# Patient Record
Sex: MALE | Race: ASIAN | Employment: FULL TIME | URBAN - METROPOLITAN AREA
[De-identification: names, ages, dates, MRNs, and addresses within clinical notes are randomized per-mention and may not be internally consistent; named-entity substitution may affect disease eponyms.]

---

## 2017-09-25 ENCOUNTER — OFFICE VISIT (OUTPATIENT)
Dept: PSYCHOLOGY | Facility: CLINIC | Age: 37
End: 2017-09-25
Payer: COMMERCIAL

## 2017-09-25 DIAGNOSIS — F41.1 GAD (GENERALIZED ANXIETY DISORDER): ICD-10-CM

## 2017-09-25 DIAGNOSIS — F32.1 MAJOR DEPRESSIVE DISORDER, SINGLE EPISODE, MODERATE (H): Primary | ICD-10-CM

## 2017-09-25 PROCEDURE — 90834 PSYTX W PT 45 MINUTES: CPT | Performed by: PSYCHOLOGIST

## 2017-09-25 ASSESSMENT — ANXIETY QUESTIONNAIRES
1. FEELING NERVOUS, ANXIOUS, OR ON EDGE: SEVERAL DAYS
7. FEELING AFRAID AS IF SOMETHING AWFUL MIGHT HAPPEN: SEVERAL DAYS
3. WORRYING TOO MUCH ABOUT DIFFERENT THINGS: SEVERAL DAYS
2. NOT BEING ABLE TO STOP OR CONTROL WORRYING: SEVERAL DAYS
IF YOU CHECKED OFF ANY PROBLEMS ON THIS QUESTIONNAIRE, HOW DIFFICULT HAVE THESE PROBLEMS MADE IT FOR YOU TO DO YOUR WORK, TAKE CARE OF THINGS AT HOME, OR GET ALONG WITH OTHER PEOPLE: VERY DIFFICULT
6. BECOMING EASILY ANNOYED OR IRRITABLE: SEVERAL DAYS
5. BEING SO RESTLESS THAT IT IS HARD TO SIT STILL: SEVERAL DAYS
GAD7 TOTAL SCORE: 7

## 2017-09-25 ASSESSMENT — PATIENT HEALTH QUESTIONNAIRE - PHQ9
5. POOR APPETITE OR OVEREATING: SEVERAL DAYS
SUM OF ALL RESPONSES TO PHQ QUESTIONS 1-9: 15

## 2017-09-25 NOTE — PROGRESS NOTES
Progress Note - Initial Session    Client Name:  Anjum Rodriguez Date: 9/25/2017           Service Type: Diagnostic Evaluation      Session Start Time: 12:00PM  Session End Time: 12:50PM      Session Length: 38 - 52      Session #: 1     Attendees: Client attended alone         Diagnostic Assessment in progress.  Unable to complete documentation at the conclusion of the first session due to needing to gather more information to complete the ADHD diagnosis.       Mental Status Assessment:  Appearance:   Appropriate   Eye Contact:   Good   Psychomotor Behavior: Normal   Attitude:   Cooperative   Orientation:   All  Speech   Rate / Production: Normal    Volume:  Normal   Mood:    Normal  Affect:    Appropriate   Thought Content:  Clear   Thought Form:  Coherent  Logical   Insight:    Good       Safety Issues and Plan for Safety and Risk Management:  Client denies current fears or concerns for personal safety.  Client denies current or recent suicidal ideation or behaviors.  Client denies current or recent homicidal ideation or behaviors.  Client denies current or recent self injurious behavior or ideation.  Client denies other safety concerns.  A safety and risk management plan has not been developed at this time, however client was given the after-hours number / 911 should there be a change in any of these risk factors.  Client reports there are I did not speak with him about firearms, but I will during his next visit. .      Diagnostic Criteria:  B. The person finds it difficult to control the worry.  C. Select 3 or more symptoms (required for diagnosis). Only one item is required in children.   - Restlessness or feeling keyed up or on edge.    - Being easily fatigued.    - Difficulty concentrating or mind going blank.    - Irritability.    - Sleep disturbance (difficulty falling or staying asleep, or restless unsatisfying sleep).   D. The focus of the anxiety and worry is not confined to features of an  Axis I disorder.  E. The anxiety, worry, or physical symptoms cause clinically significant distress or impairment in social, occupational, or other important areas of functioning.   F. The disturbance is not due to the direct physiological effects of a substance (e.g., a drug of abuse, a medication) or a general medical condition (e.g., hyperthyroidism) and does not occur exclusively during a Mood Disorder, a Psychotic Disorder, or a Pervasive Developmental Disorder.    - The aformentioned symptoms began 3 year(s) ago and occurs 7 days per week and is experienced as mild.  A) Single episode - symptoms have been present during the same 2-week period and represent a change from previous functioning 5 or more symptoms (required for diagnosis)   - Diminished interest or pleasure in all, or almost all, activities.    - Increased sleep.    - Psychomotor activity agitation.    - Fatigue or loss of energy.    - Diminished ability to think or concentrate, or indecisiveness.   B) The symptoms cause clinically significant distress or impairment in social, occupational, or other important areas of functioning  C) The episode is not attributable to the physiological effects of a substance or to another medical condition  D) The occurence of major depressive episode is not better explained by other thought / psychotic disorders  E) There has never been a manic episode or hypomanic episode        DSM5 Diagnoses: (Sustained by DSM5 Criteria Listed Above)  Diagnoses: 296.22 (F32.1)  Major Depressive Disorder, Single Episode, Moderate _  300.02 (F41.1) Generalized Anxiety Disorder  Psychosocial & Contextual Factors: The client said that when he started going to school part time, he noticed his difficulty with attention, procrastination, and follow through. He described feeling overwhelmed by his role as a student, full time employee, , and father to 6.   WHODAS 2.0 (12 item)            This questionnaire asks about  difficulties due to health conditions. Health conditions  include  disease or illnesses, other health problems that may be short or long lasting,  injuries, mental health or emotional problems, and problems with alcohol or drugs.                     Think back over the past 30 days and answer these questions, thinking about how much  difficulty you had doing the following activities. For each question, please [place] only  one response.    S1 Standing for long periods such as 30 minutes? Moderate =   3   S2 Taking care of household responsibilities? Severe =       4   S3 Learning a new task, for example, learning how to get to a new place? Mild =           2   S4 How much of a problem do you have joining community activities (for example, festivals, Yazidi or other activities) in the same way as anyone else can? Severe =       4   S5 How much have you been emotionally affected by your health problems? Moderate =   3     In the past 30 days, how much difficulty did you have in:   S6 Concentrating on doing something for ten minutes? Severe =       4   S7 Walking a long distance such as a kilometer (or equivalent)? Moderate =   3   S8 Washing your whole body? None =         1   S9 Getting dressed? None =         1   S10 Dealing with people you do not know? Moderate =   3   S11 Maintaining a friendship? Moderate =   3   S12 Your day to day work? Moderate =   3     H1 Overall, in the past 30 days, how many days were these difficulties present? Record number of days 30   H2 In the past 30 days, for how many days were you totally unable to carry out your usual activities or work because of any health condition? Record number of days  6   H3 In the past 30 days, not counting the days that you were totally unable, for how many days did you cut back or reduce your usual activities or work because of any health condition? Record number of days 10       Collateral Reports Completed:  Routed note to PCP      PLAN: (Homework,  other):  Client stated that he will return next week for the next phase of the ADHD evaluation.       Martha Freeman PsyD

## 2017-09-25 NOTE — MR AVS SNAPSHOT
"                  MRN:[id number]                      After Visit Summary   2017    Anjum Rodriguez    MRN: [id number]           Visit Information        Provider Department      2017 12:00 PM EthanmayoguillermoMartha Ms, PsyD [facility] ADHD      Your next 10 appointments already scheduled     Oct 02, 2017 12:00 PM CDT   Return Visit with Martha Freeman PsyD   [facility]    [address]  [facility] [address]   [phone]              MyChart Information     [employer] lets you send messages to your doctor, view your test results, renew your prescriptions, schedule appointments and more. To sign up, go to [email or web address] . Click on \"Log in\" on the left side of the screen, which will take you to the Welcome page. Then click on \"Sign up Now\" on the right side of the page.     You will be asked to enter the access code listed below, as well as some personal information. Please follow the directions to create your username and password.     Your access code is: [email or web address]  Expires: 2017  4:57 PM     Your access code will  in 90 days. If you need help or a new code, please call your [place] clinic or [phone].        Care EveryWhere ID     This is your Care EveryWhere ID. This could be used by other organizations to access your [place] medical records  [id number]        Equal Access to Services     JONATHAN GARCIA AH: Hadii aad ku hadasho Sobrendaali, waaxda luqadaha, qaybta kaalmada adeegyada, isela fernando . So [facility] [phone].    ATENCIÓN: Si habla español, tiene a ambriz disposición servicios gratuitos de asistencia lingüística. Llame al [phone].    We comply with applicable federal civil rights laws and [place] laws. We do not discriminate on the basis of race, color, national origin, age, disability sex, sexual orientation or gender identity.            "

## 2017-09-25 NOTE — Clinical Note
Thank you for the referral.  I saw Anjum and diagnosed him with Depression, Single Episode, Mild and Generalized Anxiety Disorder. I will see him again next week to do the next step of the ADHD evaluation.   Feel free to contact me with any questions,  Thanks.

## 2017-09-26 ASSESSMENT — ANXIETY QUESTIONNAIRES: GAD7 TOTAL SCORE: 7

## 2017-10-02 ENCOUNTER — DOCUMENTATION ONLY (OUTPATIENT)
Dept: PSYCHOLOGY | Facility: CLINIC | Age: 37
End: 2017-10-02
Payer: COMMERCIAL

## 2017-10-02 ENCOUNTER — OFFICE VISIT (OUTPATIENT)
Dept: PSYCHOLOGY | Facility: CLINIC | Age: 37
End: 2017-10-02
Payer: COMMERCIAL

## 2017-10-02 DIAGNOSIS — F32.1 MAJOR DEPRESSIVE DISORDER, SINGLE EPISODE, MODERATE (H): ICD-10-CM

## 2017-10-02 DIAGNOSIS — F40.10 SOCIAL ANXIETY DISORDER: Primary | ICD-10-CM

## 2017-10-02 DIAGNOSIS — F40.10 SOCIAL ANXIETY DISORDER: ICD-10-CM

## 2017-10-02 DIAGNOSIS — F41.1 GAD (GENERALIZED ANXIETY DISORDER): ICD-10-CM

## 2017-10-02 DIAGNOSIS — F41.1 GAD (GENERALIZED ANXIETY DISORDER): Primary | ICD-10-CM

## 2017-10-02 PROCEDURE — 90791 PSYCH DIAGNOSTIC EVALUATION: CPT | Performed by: PSYCHOLOGIST

## 2017-10-02 ASSESSMENT — ANXIETY QUESTIONNAIRES
1. FEELING NERVOUS, ANXIOUS, OR ON EDGE: MORE THAN HALF THE DAYS
3. WORRYING TOO MUCH ABOUT DIFFERENT THINGS: MORE THAN HALF THE DAYS
IF YOU CHECKED OFF ANY PROBLEMS ON THIS QUESTIONNAIRE, HOW DIFFICULT HAVE THESE PROBLEMS MADE IT FOR YOU TO DO YOUR WORK, TAKE CARE OF THINGS AT HOME, OR GET ALONG WITH OTHER PEOPLE: SOMEWHAT DIFFICULT
6. BECOMING EASILY ANNOYED OR IRRITABLE: MORE THAN HALF THE DAYS
5. BEING SO RESTLESS THAT IT IS HARD TO SIT STILL: NOT AT ALL
GAD7 TOTAL SCORE: 9
2. NOT BEING ABLE TO STOP OR CONTROL WORRYING: MORE THAN HALF THE DAYS
7. FEELING AFRAID AS IF SOMETHING AWFUL MIGHT HAPPEN: NOT AT ALL

## 2017-10-02 ASSESSMENT — PATIENT HEALTH QUESTIONNAIRE - PHQ9
5. POOR APPETITE OR OVEREATING: SEVERAL DAYS
SUM OF ALL RESPONSES TO PHQ QUESTIONS 1-9: 7

## 2017-10-02 NOTE — MR AVS SNAPSHOT
"                  MRN:[id number]                      After Visit Summary   10/2/2017    Anjum Rodriguez    MRN: [id number]           Visit Information        Provider Department      10/2/2017 12:00 PM Martha Freeman Ms, [facility] [place] [facility] ADHD      MyChart Information     [employer] lets you send messages to your doctor, view your test results, renew your prescriptions, schedule appointments and more. To sign up, go to [email or web address] . Click on \"Log in\" on the left side of the screen, which will take you to the Welcome page. Then click on \"Sign up Now\" on the right side of the page.     You will be asked to enter the access code listed below, as well as some personal information. Please follow the directions to create your username and password.     Your access code is: [email or web address]  Expires: 2017  4:57 PM     Your access code will  in 90 days. If you need help or a new code, please call your [place] clinic or [phone].        Care EveryWhere ID     This is your Care EveryWhere ID. This could be used by other organizations to access your [place] medical records  [id number]        Equal Access to Services     JONATHAN GARCIA AH: Mera Mitchell, waindira harrell, qahosea kaalmada sonya, isela doe. So [facility] [phone].    ATENCIÓN: Si habla español, tiene a ambriz disposición servicios gratuitos de asistencia lingüística. Llame al [phone].    We comply with applicable federal civil rights laws and [place] laws. We do not discriminate on the basis of race, color, national origin, age, disability, sex, sexual orientation, or gender identity.            "

## 2017-10-02 NOTE — PROGRESS NOTES
Adult Intake Structured Interview      CLIENT'S NAME: Anjum Rodriguez  MRN:   [id number]  :   1980  ACCT. NUMBER: [id number]  DATE OF SERVICE: 10/02/17      Identifying Information:  Client is a 36 year old, Hmong,  male. Client was referred for a diagnostic assessment by self.  The purpose of this evaluation is to: clarify diagnosis.  Client is currently employed full time. Client also attends school part time. Client attended the session alone.       Client's Statement of Presenting Concern:  Client reported seeking services at this time for diagnostic assessment and recommendations for treatment.  Client's presenting concerns include: difficulty focusing on the task at hand, especially related to school. Procrastination makes homework difficult, especially with classes that he does not care for.  Client stated that his symptoms have resulted in the following functional impairments: academic performance, management of the household and or completion of tasks, money management, organization, relationship(s), social interactions and work / vocational responsibilities.      History of Presenting Concern:  Client reported that he has not completed a previous ADHD diagnostic assessment.  Client has not received a previous diagnosis of any mental health conditions.  Client has not been prescribed medication to address these problems. Client reported that these problem(s) began when he was about 12 years old and the symptoms became more noticeable about 2 or 3 years ago when he started school again. Client has attempted to resolve these concerns in the past through wife helping to isolate him from family and household obligations when he needs to do homework.. Client reported that other professional(s) are not involved in providing support / services.       Social History:  Client reported he grew up in the [facility] and  "then [place], [place]. Client was the first born of 9 children. This is an intact family and parents remain . Client reported that his childhood was \"good and like any other kid growing up then. If you need discipline, you get the belt. If you do well, you get compliments from your parents.\"  Client described his childhood family environment as nurturing and stable.  As a child, client reported that he had problems with organization and keeping track of items, misplaced or lost things, forgot school work or other items between home and school, needed frequent reminders by parents to be motivated or to complete work and had difficulty managing personal hygiene. Client reported difficulty with childhood peer relationships. There were some racial fights when he move to a smaller town. He had some difficulty making friends. Client described his current relationships with family of origin as distant and some of the siblings live across the country. Client speaks to one of his brothers once a month and the rest less often..        Client reported a history of two committed relationships or marriages; he  his wife when he was 21. He said that he was \"always going out with somebody.\" Client has been  for 17 years. Client reported having 6 children.  Client identified few stable and meaningful social connections. Client reported that he has not been involved with the legal system.  He said that once when he was in high school, he was in a fight about his race. The charges were later dropped though.    Client's highest education level was associate degree / vocational certificate. During the elementary, middle, and high school years, patient recalls academic strengths in the area of science and history and art.. Client reported experiencing academic problems in math. Client did not identify any learning problems. Client did not receive tutoring services during the school years. Client did not receive special " education services. Client reported failure to finish or complete homework. Client did attend post secondary school. Client is currently in a post secondary program.     Client did not serve in the .  Client reported that he is currently employed. Client reported that the current job is a good fit for his skills and personality.  Client reported that he frequently made mistakes with poor attention to detail, often felt bored, often been late in completing projects, disorganized behavior and distractible behavior .  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  The longest period of employment has been 11 years.  Client has been terminated from a place of employment. There are no ethnic, cultural or Mandaeism factors that may be relevant for therapy. Client identified his preferred language to be English. Client reported he does not need the assistance of an  or other support involved in treatment. Modifications will not be used to assist communication in treatment.     Client reports family history includes Depression in his mother and sister; Unknown/Adopted in his father and mother.    Mental Health History:  Client reported the following biological family members or relatives with mental health issues: Mother experienced Depression, Brother experienced ADHD and Sister experienced Depression.  Client has not been previously diagnosed with a mental health diagnosis.  Client has not received mental health services in the past. Hospitalizations: None.  Previous / current commitments: None. Client is not currently receiving any mental health services.      Chemical Health History:  Client reported the following biological family members or relatives with chemical health issues: Brother reportedly uses alcohol . Client has not received chemical dependency treatment in the past. Client is not currently receiving any chemical dependency treatment. Client reports no problems as a  result of their drinking / drug use.  Client reported that he       Client Reports:  Client reports using alcohol 1 times per month and has 1 beers and mixed drinks at a time. Client first started drinking at age 17.  Client denies using tobacco.  Client denies using marijuana.  Client reports using caffeine 2 times per day and drinks 3 at a time. Client started using caffeine at age 20.  Client denies using street drugs.  Client denies the non-medical use of prescription or over the counter drugs.    CAGE: A     Patient felt ANNOYED by people criticizing their drinking (or drug use).   Based on the positive Cage-Aid score and clinical interview there  are not indications of drug or alcohol abuse. Client stated that his wife does not like him drinking caffeine. I gave him the substances handout.     Discussed the general effects of drugs and alcohol on health and well-being. Therapist gave client printed information about the effects of chemical use on his health and well being.      Significant Losses / Trauma / Abuse / Neglect Issues:  There are no indications or report of: significant losses, trauma, abuse or neglect.    Issues of possible neglect are not present.      Medical Issues:  Client has not had a physical exam to rule out medical causes for current symptoms.  Date of last physical exam was greater than a year ago and client was encouraged to schedule an exam with PCP.  The client has a [facility] Provider, who is named Nagi Barrientos.  Client reports not having a psychiatrist.  Client reported no current medical concerns.  The client denies the presence of chronic or episodic pain.  As a child, client reported having dose not remember what his sleep was like as a child..  Client reported currently experiencing sleep disturbance, including: daytime drowsiness / fatigue, sleep walking and snoring.  Client reported sleeping approximately 6 hours per night.  Client reported that he has not  completed a sleep study.  Client reported having a well balanced diet.  There are not significant nutritional concerns.  Client reported engaging in regular exercise.    Client reports current meds as:   Current Outpatient Prescriptions   Medication Sig     EPINEPHrine (EPIPEN) 0.3 MG/0.3ML injection Inject 0.3 mLs (0.3 mg) into the muscle once as needed for anaphylaxis (Patient not taking: Reported on 10/2/2017)     ranitidine (ZANTAC) 300 MG tablet Take 1 tablet (300 mg) by mouth At Bedtime (Patient not taking: Reported on 9/25/2017)     ibuprofen (ADVIL,MOTRIN) 200 MG tablet Take 3 tablets by mouth every 8 hours as needed for pain. (Patient not taking: Reported on 9/25/2017)     No current facility-administered medications for this visit.        Client Allergies:  Allergies   Allergen Reactions     Amoxicillin Hives     Shellfish Allergy Hives     the following allergies to medications: amoxicillin    Medical History:  History reviewed. No pertinent past medical history.    Medication Adherence:  N/A - Client does not have prescribed psychiatric medications.    Client was provided recommendation to complete a physical with physician.    Risk Taking Behaviors:  Client reported no history of risk taking behaviors      Motor Vehicle Operation:  Client has received a 's license.  Client has not received any moving violations.  Client reported the following driving habits: often exceeds the speed limit / speeds. He reported that speeding and erratic driving has not been an issue since he was  in his early 20s. According to client, other people are not comfortable riding as a passenger when he is driving.        Mental Status Assessment:  Appearance:   Appropriate   Eye Contact:   Good   Psychomotor Behavior: Normal   Attitude:   Cooperative   Orientation:   All  Speech   Rate / Production: Normal    Volume:  Normal   Mood:    Normal  Affect:    Appropriate   Thought Content:  Clear   Thought  Form:  Coherent  Logical   Insight:    Good       Review of Symptoms:  Depression: Sleep Interest Guilt Energy Psychomotor slowing or agitation Hopeless Irritability  Marisol:  No symptoms  Psychosis: No symptoms  Anxiety: Worries Nervousness  Panic:  No symptoms  Post Traumatic Stress Disorder: No symptoms  Obsessive Compulsive Disorder: No symptoms  Eating Disorder: No symptoms  Oppositional Defiant Disorder: No symptoms  ADD / ADHD: Attention Listening Task Completion Organization Distractiblity Forgetful  Conduct Disorder: No symptoms  Reckless Behavior: None noted at this time        Safety Issues and Plan for Safety and Risk Management:  Client denies a history of suicidal ideation, suicide attempts, self-injurious behavior, homicidal ideation, homicidal behavior and and other safety concerns    Client denies current fears or concerns for personal safety.  Client denies current or recent suicidal ideation or behaviors.  Client denies current or recent homicidal ideation or behaviors.  Client denies current or recent self injurious behavior or ideation.  Client denies other safety concerns.  Client reports there are firearms in the house. The firearms are not secured in a locked space. Client was advised to secure all firearms.  A safety and risk management plan has not been developed at this time, however client was given the after-hours number / 911 should there be a change in any of these risk factors.      Patient's Strengths and Limitations:  Client identified the following strengths or resources that will help him succeed in counseling: family support. Client identified the following supports: family. Things that may interfere with the clients success in counseling include:few friends.      Diagnostic Criteria:  A. Excessive anxiety and worry about a number of events or activities (such as work or school performance).   B. The person finds it difficult to control the worry.   - Restlessness or feeling keyed  up or on edge.    - Being easily fatigued.    - Difficulty concentrating or mind going blank.    - Irritability.    - Sleep disturbance (difficulty falling or staying asleep, or restless unsatisfying sleep).   D. The focus of the anxiety and worry is not confined to features of an Axis I disorder.  E. The anxiety, worry, or physical symptoms cause clinically significant distress or impairment in social, occupational, or other important areas of functioning.   F. The disturbance is not due to the direct physiological effects of a substance (e.g., a drug of abuse, a medication) or a general medical condition (e.g., hyperthyroidism) and does not occur exclusively during a Mood Disorder, a Psychotic Disorder, or a Pervasive Developmental Disorder.    - The aformentioned symptoms began 3 year(s) ago and occurs 7 days per week and is experienced as moderate.  Social Anxiety Disorder   Major Depressive Disorder:  - Depressed mood. Note: In children and adolescents, can be irritable mood.     - Diminished interest or pleasure in all, or almost all, activities.    - Psychomotor activity retardation.    - Fatigue or loss of energy.    - Feelings of worthlessness or excessive guilt.   B) The symptoms cause clinically significant distress or impairment in social, occupational, or other important areas of functioning  C) The episode is not attributable to the physiological effects of a substance or to another medical condition  D) The occurence of major depressive episode is not better explained by other thought / psychotic disorders  E) There has never been a manic episode or hypomanic episode     Social Anxiety Disorder Criteria:  A. Marked fear of anxiety in one or more social situations in which he is exposed to scrutiny.   B. Individual fears he will act in a way or show anxiety symptoms that will be negatively evaluated.   C. The social situations almost always provoke anxiety  D. The social situations are avoided or  endured with intense fear or anxiety.   E. The anxiety is out of proportion to the actual threat posed by the situation.   F. The anxiety is persistent and has lasted more than 6 months.   G. The anxiety causes clinically significant stress  H. The anxiety is not attributable to the physiological effects of a substance or another medical condition.   I. The anxiety is not better explained by another disorder.   J. If another medical condition is present, the anxiety is excessive or unrelated.     Functional Status:  Client's symptoms are causing reduced functional status in the following areas: Academics / Education - Difficulty completing his homework and finding motivation to engage in school work  Activities of Daily Living - He reports that following through with chores is difficult  Social / Relational - He reports that there is sometimes stress with his wife, because he uses caffeine to help him focus and find energy during the day      DSM-IV Diagnoses: (Sustained by DSM-IV Criteria Listed Above)  AXIS I: 296.22 - Major Depressive Disorder, Single Episode, Moderate    300.02 - Generalized Anxiety Disorder    300.23 Social Anxiety Disorder  314.00 - Attention Deficit Non-Hyperactive Rule Out  AXIS II: V71.09 - No Diagnosis  AXIS III: No Diagnosis  AXIS IV: No Diagnosis  AXIS V: No Diagnosis  Current GAF estimated at:  Moderate difficulty in social situations and with school    ( 51 - 60   Moderate symptoms (e.g., flat affect and circumstantial speech, occasional panic attacks) OR moderate difficulty in social, occupational, or school functioning (e.g., few friends, conflicts with peers or co-workers)).  Highest GAF past year estimated at:  Client reports that his current level of functioning is consistent with his last three years since he started school    ( 51 - 60   Moderate symptoms (e.g., flat affect and circumstantial speech, occasional panic attacks) OR moderate difficulty in social, occupational, or  school functioning (e.g., few friends, conflicts with peers or co-workers)).    Attendance Agreement:  Client has signed Attendance Agreement:Yes      Preliminary Plan:  The client reports no currently identified Evangelical, ethnic or cultural issues relevant to therapy.     services are not indicated.    Modifications to assist communication are not indicated.    The client's request for clarity with his diagnosis will be barrier out throughout the evaluation process.     Collaboration with other professionals is not indicated at this time.    Referral to another professional/service is not indicated at this time..    A Release of Information is not needed at this time.    Client was given self and collaborative rating scales last week to be completed prior to today's appointment and they were done.  Depression and anxiety rating scales were completed.  Copies of no documents were requested.  A second appointment was not scheduled at this time.  I will call the client after I score his assessments. We will then talk about his next steps.     Report to child / adult protection services was NA.    Client will have access to their [facility]' medical record.    Martha Freeman PsyD  October 2, 2017

## 2017-10-03 ENCOUNTER — DOCUMENTATION ONLY (OUTPATIENT)
Dept: BEHAVIORAL HEALTH | Facility: CLINIC | Age: 37
End: 2017-10-03
Payer: COMMERCIAL

## 2017-10-03 DIAGNOSIS — F32.1 MAJOR DEPRESSIVE DISORDER, SINGLE EPISODE, MODERATE (H): Primary | ICD-10-CM

## 2017-10-03 DIAGNOSIS — F40.10 SOCIAL ANXIETY DISORDER: ICD-10-CM

## 2017-10-03 DIAGNOSIS — F41.1 GAD (GENERALIZED ANXIETY DISORDER): ICD-10-CM

## 2017-10-03 PROCEDURE — 96101 HC PSYCHOLOGICAL TEST BY PSYCHOLOGIST/MD, PER HR: CPT | Performed by: PSYCHOLOGIST

## 2017-10-03 ASSESSMENT — ANXIETY QUESTIONNAIRES: GAD7 TOTAL SCORE: 9

## 2017-11-21 NOTE — PROGRESS NOTES
[facility]  ADHD Evaluation          Patient: nAjum Rodriguez  YOB: 1980  MRN: [id number]  Date(s) of assessment:  David self-report and collateral measures scored and interpreted 10/02/2017    Information about appointment:  Client attended two  sessions to aid in determining client's mental health diagnosis or diagnoses and treatment recommendations that best address client concerns. A diagnostic assessment was conducted during the first two appointments. Client completed several rating scales to assist in assessing attention-related and other mental health symptoms that may be causing impairments in functioning. Rating scales were also completed by a collateral contact.    Assessment tools:      David Adult ADHD Rating Scale-IV: Self and Other Reports (BAARS-IV), David Functional Impairment Scale: Self and Other Reports (BFIS) and David Deficits in Executive Functioning Scale: Self and Other Reports (BDEFS)    Assessment Results:    Behavioral Observations:  The client was cooperative and engaged in conversations.   Appearance:                    Appropriate   Eye Contact:                   Good   Psychomotor Behavior:   Normal   Attitude:                     Cooperative   Orientation:          All  Speech          Rate / Production:   Normal           Volume:                  Normal   Mood:                      Normal  Affect:                             Appropriate   Thought Content:           Clear   Thought Form:        Coherent  Logical   Insight:                    Good        David Adult ADHD Rating Scale-IV: Self and Other Reports (BAARS-IV)  The BAARS-IV assesses for symptoms of ADHD that are experienced in one's daily life. This assessment measure includes self and collateral rating scales designed to provide information regarding current and childhood symptoms of ADHD including inattention, hyperactivity, and impulsivity. Self-report scores are reported as  "percentiles. Scores at the 76th-83rd percentile are considered marginal, scores at the 84th-92nd percentile are considered borderline, scores at the 93rd-95th percentile are considered mild, scores at the 96th-98th percentile are considered moderate, and those at the 99th percentile are considered severe. Collateral or \"other\" rating scales are reported as number of symptoms observed in comparison to those reported by the client. Norms and percentile scores are not available for collateral reports.     Current Symptoms Scale--Self Report:   Client completed the self-report inventory of current symptoms. The results indicate that the client's Total ADHD Score was 42 which places him in the 94th percentile for overall ADHD symptoms. In addition, the client endorsed 6/9 (97th percentile) Inattention symptoms, 2/5 (93rd percentile) Hyperactivity symptoms, 0/4 (51-75th percentile) Impulsivity symptoms, and 5/9 (94th percentile) Sluggish Cognitive Tempo symptoms. Client indicated that the reported symptoms have resulted in impaired functioning in school, home, work and social relationships. Overall, the results suggest the client is experiencing Mild ADHD symptoms. He is in the following overall ranges: Mild for Total ADHD, hyperactivity, and sluggish cognitive tempo, and a moderate score for inattention.     Current Symptoms Scale--Other Report:  Client's wife completed the collateral report inventory of current symptoms. Based on the collateral contact's observation of symptoms, the client demonstrates 7/9 Inattention symptoms, 1/5 Hyperactivity symptoms, 0/4 Impulsivity symptoms, and 8/9 Sluggish Cognitive Tempo symptoms. The client's Total ADHD Score was 38. The collateral contact indicated the client demonstrates impaired functioning in school, home, work and social relationships. The collateral- and self-report scores are not significantly different. All of the collateral's scores were within a normal range of " deviation from the client's self-report scores.     Childhood Symptoms Scale--Self-Report:  Client completed the self-report inventory of childhood symptoms. The results indicate that the client's Total ADHD Score was 43 which places him in the 90th percentile for overall ADHD symptoms in childhood. In addition, the client endorsed 7/9 (94th percentile) Inattention symptoms and  2/9 (82nd percentile) Hyperactivity-Impulsivity symptoms. Client indicated that the reported symptoms resulted in impaired functioning in school, home and social relationships Overall, the results suggest the client experienced  Borderline symptoms of ADHD as a child.     Childhood Symptoms Scale--Other Report:  Client's brother completed the collateral report inventory of childhood symptoms. Based on the collateral contact's recollection of client's childhood symptoms, the client demonstrated 0/9 Inattention symptoms and 2/9 Hyperactivity-Impulsivity symptoms. The client's Total ADHD Score was 16. The collateral contact indicated the client demonstrates impaired functioning in school and home.The collateral and self-report scores are different. The client reported the same number of hyperactivity-impulsivity symptoms as his brother did; however, the client endorsed 7 as compared to 0 inattention symptoms reported by his brother for the client's childhood.                           David Functional Impairment Scale: Self and Other Reports (BFIS)  The BFIS is used to assess an individuals' psychosocial impairment in major life/daily activities that may be due to a mental health disorder. This assessment measure includes self and collateral rating scales. Self-report scores are reported as percentiles. Scores at the 76th-83rd percentile are considered marginal, scores at the 84th-92nd percentile are considered borderline, scores at the 93rd-95th percentile are considered mild, scores at the 96th-98th percentile are considered moderate, and  "those at the 99th percentile are considered severe.Collateral or \"other\" rating scales are reported as number of symptoms observed in comparison to those reported by the client. Norms and percentile scores are not available for collateral reports.     Results indicate the client identified impairment (scores at or greater than 93rd percentile) in the following areas: work, social-friends, education and money management The client's Mean Impairment Score was 5.14 (90th percentile) indicating the client is reporting Borderline impairment in functioning across domains. Client's wife completed the collateral rating scale, which indicated mostly similar results. The collateral contact's scores were generally higher than the client's report. The client's wife also scored him in the 93rd percentile or higher for the following areas: social interactions with strangers, community activities, sexual activities, and maintaining health.     David Deficits in Executive Functioning Scale (BDEFS)  The BDEFS is a measure used for evaluating dimensions of adult executive functioning in daily life.This assessment measure includes self and collateral rating scales. Self-report scores are reported as percentiles. Scores at the 76th-83rd percentile are considered marginal, scores at the 84th-92nd percentile are considered borderline, scores at the 93rd-95th percentile are considered mild, scores at the 96th-98th percentile are considered moderate, and those at the 99th percentile are considered severe.Collateral or \"other\" rating scales are reported as number of symptoms observed in comparison to those reported by the client. Norms and percentile scores are not available for collateral reports.       Results indicate the client's Total Executive Functioning Score was 200  (93rd percentile). The ADHD-Executive Functioning Index score was 24 (92nd percentile). These scores suggest the client has Borderline to Mild deficits in executive " functioning. These deficits may not to be due to ADHD. Results indicate the client identified significant deficits in the following areas: self-organization/problem-solving 98th Percentile/Severe and self-motivation 94th Percentile (Borderline). Client's wife completed the collateral rating scale, which indicated discrepant results. As compared to the client's report, the collateral contact's scores were higher for Self-Management to Time, lower for Self-Organization and similar for Self-Restraint, Self-Motivation, and Self-Regulation of Emotions.     DSM5 Diagnoses: (Sustained by DSM5 Criteria Listed Above)  Diagnoses: 296.22 (F32.1)  Major Depressive Disorder, Single Episode, Moderate _  300.23 (F40.10) Social Anxiety Disorder  300.02 (F41.1) Generalized Anxiety Disorder;   Rule Out Attention Deficit Disorders     Plan:  The client will be contacted to complete an MMPI to determine if there is a diagnosis that might be encouraging symptoms of inattention.     Martha Freeman PsyD LP 10/03/2017

## 2017-12-11 NOTE — PROGRESS NOTES
"Updated BAARS Percentiles:    David Adult ADHD Rating Scale-IV: Self and Other Reports (BAARS-IV)  The BAARS-IV assesses for symptoms of ADHD that are experienced in one's daily life. This assessment measure includes self and collateral rating scales designed to provide information regarding current and childhood symptoms of ADHD including inattention, hyperactivity, and impulsivity. Self-report scores are reported as percentiles. Scores at the 76th-83rd percentile are considered marginal, scores at the 84th-92nd percentile are considered borderline, scores at the 93rd-95th percentile are considered mild, scores at the 96th-98th percentile are considered moderate, and those at the 99th percentile are considered severe. Collateral or \"other\" rating scales are reported as number of symptoms observed in comparison to those reported by the client. Norms and percentile scores are not available for collateral reports.      Current Symptoms Scale--Self Report:   Client completed the self-report inventory of current symptoms. The results indicate that the client's Total ADHD Score was 42 which places him in the 94th percentile for overall ADHD symptoms. In addition, the client endorsed 6/9 (97th percentile) Inattention symptoms, 2/9 (88th percentile) Hyperactivity-Impulsivity symptoms, and 5/9 (94th percentile) Sluggish Cognitive Tempo symptoms. Client indicated that the reported symptoms have resulted in impaired functioning in school, home, work and social relationships. Overall, the results suggest the client is experiencing Mild ADHD symptoms. He is in the following overall ranges: Mild for Total ADHD and sluggish cognitive tempo, borderline for hyperactivity-impulsivity, and a moderate score for inattention.      Current Symptoms Scale--Other Report:  Client's wife completed the collateral report inventory of current symptoms. Based on the collateral contact's observation of symptoms, the client demonstrates 7/9 " Inattention symptoms, 1/5 Hyperactivity symptoms, 0/4 Impulsivity symptoms, and 8/9 Sluggish Cognitive Tempo symptoms. The client's Total ADHD Score was 38. The collateral contact indicated the client demonstrates impaired functioning in school, home, work and social relationships. The collateral- and self-report scores are not significantly different. All of the collateral's scores were within a normal range of deviation from the client's self-report scores.      Childhood Symptoms Scale--Self-Report:  Client completed the self-report inventory of childhood symptoms. The results indicate that the client's Total ADHD Score was 43 which places him in the 90th percentile for overall ADHD symptoms in childhood. In addition, the client endorsed 7/9 (96th percentile) Inattention symptoms and  2/9 (83rd percentile) Hyperactivity-Impulsivity symptoms. Client indicated that the reported symptoms resulted in impaired functioning in school, home and social relationships Overall, the results suggest the client experienced  Borderline symptoms of ADHD as a child.      Childhood Symptoms Scale--Other Report:  Client's brother completed the collateral report inventory of childhood symptoms. Based on the collateral contact's recollection of client's childhood symptoms, the client demonstrated 0/9 Inattention symptoms and 2/9 Hyperactivity-Impulsivity symptoms. The client's Total ADHD Score was 16. The collateral contact indicated the client demonstrates impaired functioning in school and home.The collateral and self-report scores are different. The client reported the same number of hyperactivity-impulsivity symptoms as his brother did; however, the client endorsed 7 as compared to 0 inattention symptoms reported by his brother for the client's childhood.        300.23 Social Anxiety Disorder, 300.02 - Generalized Anxiety Disorder, 296.22 - Major Depressive Disorder, Single Episode, Moderate     314.00 - Attention Deficit  Non-Hyperactive Rule Out

## 2018-03-08 ENCOUNTER — FCC EXTENDED DOCUMENTATION (OUTPATIENT)
Dept: PSYCHOLOGY | Facility: CLINIC | Age: 38
End: 2018-03-08

## 2018-03-08 NOTE — PROGRESS NOTES
Discharge Summary  Multiple Sessions for ADHD Assessments    Client Name: Anjum Rodriguez MRN#: [id number] YOB: 1980      Intake / Discharge Date: Intake: 09/25/2017 Discharge: 3/8/2018       DSM5 Diagnoses: (Sustained by DSM5 Criteria Listed Above)  Diagnoses: 296.22 (F32.1)  Major Depressive Disorder, Single Episode, Moderate _  300.23 (F40.10) Social Anxiety Disorder  300.02 (F41.1) Generalized Anxiety Disorder  Psychosocial & Contextual Factors: Client reports difficulty focusing since going back to college. He states difficulty with attention and concentration.   WHODAS 2.0 (12 item) Score: 34          Presenting Concern:  Client reported seeking services for diagnostic assessment and recommendations for treatment.  Client's presenting concerns include: difficulty focusing on the task at hand, especially related to school. Procrastination makes homework difficult, especially with classes that he does not care for.  Client stated that his symptoms have resulted in the following functional impairments: academic performance, management of the household and or completion of tasks, money management, organization, relationship(s), social interactions and work / vocational responsibilities.    Reason for Discharge:  Client did not return.       Disposition at Time of Last Encounter:   Comments:   The client appeared anxious about finding time to complete his homework, do his job, spend time with his children, and focus on his marriage. He appeared to have normal affect with a slightly anxious disposition.      Risk Management:   Client denies a history of suicidal ideation, suicide attempts, self-injurious behavior, homicidal ideation, homicidal behavior and and other safety concerns  A safety and risk management plan has not been developed at this time, however client was given the after-hours number / 911 should there be a change in any of these risk factors.      Referred To:  The client  was mailed a letter informing him that he can contact [facility] if he would like to continue with the ADHD assessment process.         Martha Freeman PsyD LP   3/8/2018

## 2020-11-03 ENCOUNTER — OFFICE VISIT (OUTPATIENT)
Dept: URGENT CARE | Facility: URGENT CARE | Age: 40
End: 2020-11-03
Payer: COMMERCIAL

## 2020-11-03 VITALS
TEMPERATURE: 98.5 F | DIASTOLIC BLOOD PRESSURE: 91 MMHG | SYSTOLIC BLOOD PRESSURE: 141 MMHG | OXYGEN SATURATION: 95 % | HEART RATE: 92 BPM

## 2020-11-03 DIAGNOSIS — J02.9 SORE THROAT: Primary | ICD-10-CM

## 2020-11-03 DIAGNOSIS — M79.10 MYALGIA: ICD-10-CM

## 2020-11-03 DIAGNOSIS — Z20.822 SUSPECTED COVID-19 VIRUS INFECTION: ICD-10-CM

## 2020-11-03 LAB
DEPRECATED S PYO AG THROAT QL EIA: NEGATIVE
FLUAV+FLUBV AG SPEC QL: NEGATIVE
FLUAV+FLUBV AG SPEC QL: NEGATIVE
SPECIMEN SOURCE: NORMAL
SPECIMEN SOURCE: NORMAL

## 2020-11-03 PROCEDURE — 87651 STREP A DNA AMP PROBE: CPT | Performed by: FAMILY MEDICINE

## 2020-11-03 PROCEDURE — 99203 OFFICE O/P NEW LOW 30 MIN: CPT | Performed by: FAMILY MEDICINE

## 2020-11-03 PROCEDURE — 87804 INFLUENZA ASSAY W/OPTIC: CPT | Performed by: FAMILY MEDICINE

## 2020-11-03 PROCEDURE — U0003 INFECTIOUS AGENT DETECTION BY NUCLEIC ACID (DNA OR RNA); SEVERE ACUTE RESPIRATORY SYNDROME CORONAVIRUS 2 (SARS-COV-2) (CORONAVIRUS DISEASE [COVID-19]), AMPLIFIED PROBE TECHNIQUE, MAKING USE OF HIGH THROUGHPUT TECHNOLOGIES AS DESCRIBED BY CMS-2020-01-R: HCPCS | Performed by: FAMILY MEDICINE

## 2020-11-03 PROCEDURE — 99N1174 PR STATISTIC STREP A RAPID: Performed by: FAMILY MEDICINE

## 2020-11-03 NOTE — LETTER
November 3, 2020      Anjum Rodriguez  [address] [address]        To Whom It May Concern:    Anjum Rodriguez  was seen on 11/3.  Please excuse him from work 11/4 - 11/13 due to illness, possible COVID infection, COVID screen obtained today.  He will need to quarantine for at least 10 days.        Sincerely,        Jasson Sepulveda MD

## 2020-11-04 LAB
SPECIMEN SOURCE: NORMAL
STREP GROUP A PCR: NOT DETECTED

## 2020-11-04 NOTE — PROGRESS NOTES
"SUBJECTIVE:   Anjum Rodriguez is a 39 year old male presenting with a chief complaint of body aches, sore throat, fatigue, anterior chest sore and when breathes felt as if \"lungs\" sore.  Onset of symptoms was this morning.  Course of illness is improve with medications.    Severity moderate  Current and Associated symptoms: body aches, lungs sore, sore throat  Treatment measures tried include Fluids, Rest and ibuprofen.  Predisposing factors include None.    Has not gotten flu vaccination.    Wears mask when leaves home.  Wife works in healthcare.  No close ill contacts with positive strep or positive COVID.    Works in tech support/computer, has been working remotely more since COVID pandemic.    No history of asthma, DM, CAD    TOB use - recently stopped about 7 months    Family history - father with HTN    No past medical history on file.  Current Outpatient Medications   Medication Sig Dispense Refill     EPINEPHrine (EPIPEN) 0.3 MG/0.3ML injection Inject 0.3 mLs (0.3 mg) into the muscle once as needed for anaphylaxis 2 each 3     ibuprofen (ADVIL,MOTRIN) 200 MG tablet Take 3 tablets by mouth every 8 hours as needed for pain. 60 tablet 0     ranitidine (ZANTAC) 300 MG tablet Take 1 tablet (300 mg) by mouth At Bedtime (Patient not taking: Reported on 11/3/2020) 15 tablet 1     Social History     Tobacco Use     Smoking status: Current Every Day Smoker     Packs/day: 0.25     Types: Cigarettes     Start date: 8/1/1999     Smokeless tobacco: Never Used     Tobacco comment: OUIT 6-7MONTHS AGO 11/03/20   Substance Use Topics     Alcohol use: Yes     Comment: 1-2 times per month has two drinks       ROS:  Review of systems negative except as stated above.    OBJECTIVE:  BP (!) 141/91 (BP Location: Right arm)   Pulse 92   Temp 98.5  F (36.9  C) (Oral)   SpO2 95%   GENERAL APPEARANCE: healthy, alert and no distress  EYES: EOMI,  PERRL, conjunctiva clear  HENT: mouth without ulcers.  Pharynx mildly irritated  RESP: lungs " clear to auscultation - no rales, rhonchi or wheezes  CV: regular rates and rhythm, normal S1 S2, no murmur noted  PSYCH: mentation appears normal and affect normal/bright    Results for orders placed or performed in visit on 11/03/20   Streptococcus A Rapid Scr w Reflx to PCR     Status: None    Specimen: Throat   Result Value Ref Range    Strep Specimen Description Throat     Streptococcus Group A Rapid Screen Negative NEG^Negative   Influenza A & B Antigen     Status: None   Result Value Ref Range    Influenza A/B Agn Specimen Nasal     Influenza A Negative NEG^Negative    Influenza B Negative NEG^Negative       ASSESSMENT/PLAN:  (J02.9) Sore throat  (primary encounter diagnosis)  Plan: Streptococcus A Rapid Scr w Reflx to PCR, Group        A Streptococcus PCR Throat Swab            (M79.10) Myalgia  Plan: Influenza A & B Antigen            (Z20.828) Suspected COVID-19 virus infection  Plan: Symptomatic COVID-19 Virus (Coronavirus) by PCR            Reassurance given, reviewed symptomatic treatment with tylenol, ibuprofen, plenty of fluids and rest.  Will follow up on throat culture and treat if positive for strep.    Reviewed possible COVID infection due to symptoms presentation, COVID screen obtained today.  Discussed quarantine for 10 days even with negative COVID screen.    Elevated BP, may be due to current illness, encourage to obtain BP check when feels better.  Encourage healthy habits with low salt, decrease stress, and exercise, monitor caffeine intake.    Work excuse note given  Follow up with primary provider if no improvement of symptoms in 1 week    PPE - mask, face shield, gown and gloves     Jasson Sepulveda MD  November 3, 2020 7:21 PM

## 2020-11-05 LAB
SARS-COV-2 RNA SPEC QL NAA+PROBE: NOT DETECTED
SPECIMEN SOURCE: NORMAL